# Patient Record
(demographics unavailable — no encounter records)

---

## 2025-02-14 NOTE — PHYSICAL EXAM
[Chaperone Present] : A chaperone was present in the examining room during all aspects of the physical examination [63100] : A chaperone was present during the pelvic exam. [Labia Majora] : normal [Labia Minora] : normal [Normal] : normal [Uterine Adnexae] : normal [FreeTextEntry1] : Left labial abscess, +TTP, +foul odor

## 2025-02-14 NOTE — HISTORY OF PRESENT ILLNESS
[FreeTextEntry1] : Pt is a 30yo F who presents with Left Bartholin's cyst x 2d, with possible rupture today. Had similar episode 2yrs ago.  Pt also requests STI screening.

## 2025-03-04 NOTE — HISTORY OF PRESENT ILLNESS
[FreeTextEntry1] : 31 year F  presents for annual visit. s/p I&D left vulvar abscess on 2/14/2025 Also recently treated for BV on 2/14/2025 Sexually active  Denies interval changes in Med/Surg/Family Hx.  LMP 2/8/2025   Health Maintenance   Pap - 10/2022

## 2025-03-04 NOTE — PHYSICAL EXAM
[Chaperone Present] : A chaperone was present in the examining room during all aspects of the physical examination [Appropriately responsive] : appropriately responsive [Alert] : alert [No Acute Distress] : no acute distress [No Lymphadenopathy] : no lymphadenopathy [Soft] : soft [Non-tender] : non-tender [Non-distended] : non-distended [No HSM] : No HSM [No Lesions] : no lesions [No Mass] : no mass [Oriented x3] : oriented x3 [Examination Of The Breasts] : a normal appearance [No Masses] : no breast masses were palpable [Labia Majora] : normal [Labia Minora] : normal [Normal] : normal [Uterine Adnexae] : normal [Scant] : There was scant vaginal bleeding